# Patient Record
Sex: FEMALE | Race: WHITE | Employment: STUDENT | URBAN - METROPOLITAN AREA
[De-identification: names, ages, dates, MRNs, and addresses within clinical notes are randomized per-mention and may not be internally consistent; named-entity substitution may affect disease eponyms.]

---
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
## 2022-05-30 ENCOUNTER — WALK IN (OUTPATIENT)
Dept: URGENT CARE | Age: 22
End: 2022-05-30
Attending: EMERGENCY MEDICINE

## 2022-05-30 VITALS
OXYGEN SATURATION: 98 % | DIASTOLIC BLOOD PRESSURE: 65 MMHG | RESPIRATION RATE: 16 BRPM | SYSTOLIC BLOOD PRESSURE: 110 MMHG | TEMPERATURE: 96.5 F | HEART RATE: 66 BPM | WEIGHT: 203 LBS

## 2022-05-30 DIAGNOSIS — H61.23 BILATERAL IMPACTED CERUMEN: Primary | ICD-10-CM

## 2022-05-30 PROCEDURE — 69210 REMOVE IMPACTED EAR WAX UNI: CPT

## 2022-05-30 PROCEDURE — 99212 OFFICE O/P EST SF 10 MIN: CPT

## 2023-03-06 ENCOUNTER — TELEPHONE (OUTPATIENT)
Dept: FAMILY MEDICINE CLINIC | Facility: CLINIC | Age: 23
End: 2023-03-06

## 2023-03-14 ENCOUNTER — PATIENT OUTREACH (OUTPATIENT)
Dept: CASE MANAGEMENT | Age: 23
End: 2023-03-14

## 2023-03-14 NOTE — PROCEDURES
The office order for PCP removal request is Approved and finalized on March 14, 2023.     Thanks,  [facility] [employer]

## 2023-12-27 ENCOUNTER — WALK IN (OUTPATIENT)
Dept: URGENT CARE | Age: 23
End: 2023-12-27
Attending: EMERGENCY MEDICINE

## 2023-12-27 VITALS
DIASTOLIC BLOOD PRESSURE: 99 MMHG | OXYGEN SATURATION: 94 % | HEART RATE: 105 BPM | TEMPERATURE: 98.1 F | SYSTOLIC BLOOD PRESSURE: 151 MMHG | RESPIRATION RATE: 18 BRPM

## 2023-12-27 DIAGNOSIS — H66.92 LEFT ACUTE OTITIS MEDIA: Primary | ICD-10-CM

## 2023-12-27 DIAGNOSIS — J01.90 ACUTE NON-RECURRENT SINUSITIS, UNSPECIFIED LOCATION: ICD-10-CM

## 2023-12-27 RX ORDER — FLUTICASONE PROPIONATE 50 MCG
1 SPRAY, SUSPENSION (ML) NASAL 2 TIMES DAILY
Qty: 16 G | Refills: 12 | Status: SHIPPED | OUTPATIENT
Start: 2023-12-27

## 2023-12-27 RX ORDER — AZITHROMYCIN 250 MG/1
TABLET, FILM COATED ORAL
Qty: 6 TABLET | Refills: 0 | Status: SHIPPED | OUTPATIENT
Start: 2023-12-27

## 2023-12-27 RX ORDER — IBUPROFEN 600 MG/1
600 TABLET ORAL EVERY 6 HOURS PRN
COMMUNITY

## 2023-12-27 ASSESSMENT — PAIN SCALES - GENERAL: PAINLEVEL: 6

## 2024-05-04 NOTE — ED PROVIDER NOTES
Patient Seen in: [address] [facility]    History   Patient presents with:  Abdomen/Flank Pain (GI/)  Nausea/Vomiting/Diarrhea (gastrointestinal)    Stated Complaint: STOMACH PAIN X 4 DAYS    HPI    61-year-old female brought in by Exam    Patient is alert oriented ×3 in no acute distress   conjunctiva clear no icterus  Oropharynx : No erythema, no exudates.   Neck supple full range of motion, no cervical lymphadenopathy bilaterally  Lungs clear to auscultation bilaterally  Heart S1-S Matheus Jesus MD                 [facility]   Patient with acute vomiting and diarrhea with intermittent abdominal pain. Likely viral gastroenteritis. CBC, i-STAT results reviewed with patient. Gave Zofran 4 mg as needed for nausea and vomiting. Push fluids.   Bent No

## (undated) NOTE — LETTER
Name:  Linda Connelly Year:  10th Grade Class: Student ID No.:   Address:  [address] [place] [address] Phone:  [phone] (home) [phone] (work) :  15year old   Name Relationship [address] Work Xagenic syndrome, arrhythmogenic right ventricular cardiomyopathy, long QT syndrome, short QT syndrome, Brugada syndrome, or catecholaminergic polymorphic ventricular tachycardia? No   15.  Does anyone in your family have a heart problem, pacemaker, or implanted de 35. Have you ever had a hit or blow to the head that caused confusion, prolonged headache, or memory problems? No   36. Do you have a history of seizure disorder? No   37. Do you have headaches with exercise? No   38.  Have you ever had numbness, tingling, 144 lb   LMP 07/05/2017 (Approximate)   BMI 24.53 kg/m²  86 %ile (Z= 1.07) based on CDC 2-20 Years BMI-for-age data using vitals from 7/17/2017. female    Vision: [address] FINDINGS   Appearance: physicals.    [facility] Substance Testing Policy Consent to Random Testing   (This section for high school students only)   0558-4867 school term    As a prerequisite to participation in [employer] activities, we agree that I/our student will not use performa

## (undated) NOTE — LETTER
[facility] IN [place]  [address] [address]  Dept: [phone]  Dept Fax: [phone]      January 11, 2017    Patient: Juan Brantley   Date of Visit: 1/11/2017       To Whom It May Concern:    Martha Kinsey was

## (undated) NOTE — LETTER
Date & Time: 3/2/2020, 2:06 PM  Patient: Guero Noriega  Encounter Provider(s):    Will Quinones       To Whom It May Concern:    Marisela Wilson was seen and treated in our department on 3/2/2020.  She should not return to work until 3/4/

## (undated) NOTE — ED AVS SNAPSHOT
[facility] in [address] [place] [address] Drive,[address]    [address] [facility]    Phone:  [phone]    Fax:  [address]   MRN: [id number]    Department:  [facility] END   Date of Visit:  1/11/20 Where to Get Your Medications      These medications were sent to [facility] [phone], [place] [place], [address] [place] [address]     Phone:  [phone]    - azithromycin 250 MG Tabs  - Neomycin-Poly You were examined and treated today on an urgent basis only. This was not a substitute for ongoing medical care. Often, one Immediate Care visit does not uncover every injury or illness.  If you have been referred to a primary care or a specialist physicia [place] Harini Jass[address] ([address]) [phone] [facility]. [address]) [phone]                Additional Information       We are concerned for your overall well being:    - If you are a smoker o